# Patient Record
Sex: FEMALE
[De-identification: names, ages, dates, MRNs, and addresses within clinical notes are randomized per-mention and may not be internally consistent; named-entity substitution may affect disease eponyms.]

---

## 2019-09-13 ENCOUNTER — HOSPITAL ENCOUNTER (OUTPATIENT)
Dept: HOSPITAL 92 - BICMAMMO | Age: 59
Discharge: HOME | End: 2019-09-13
Attending: PHYSICIAN ASSISTANT
Payer: COMMERCIAL

## 2019-09-13 DIAGNOSIS — Z12.31: Primary | ICD-10-CM

## 2019-09-13 PROCEDURE — 77063 BREAST TOMOSYNTHESIS BI: CPT

## 2019-09-13 PROCEDURE — 77067 SCR MAMMO BI INCL CAD: CPT

## 2019-09-13 NOTE — MMO
Bilateral MAMMO Bilat Screen [facility].

 

CLINICAL HISTORY:

Patient is 58 years old and is seen for screening. The patient has no family

history of breast cancer.  The patient has no personal history of cancer.

 

VIEWS:

The views performed were:  bilateral craniocaudal with tomosynthesis and

bilateral mediolateral oblique with tomosynthesis.

 

FILMS COMPARED:

The present examination has been compared to prior imaging studies performed at

[facility] on 10/29/2004, and at [facility] on

08/13/1998 and 08/08/2000.

 

This study has been interpreted with the assistance of computer-aided detection.

 

MAMMOGRAM FINDINGS:

There are scattered fibroglandular densities.

 

There are no suspicious masses, suspicious calcifications, or new areas of

architectural distortion.

 

IMPRESSION:

THERE IS NO MAMMOGRAPHIC EVIDENCE OF MALIGNANCY.

 

A ROUTINE FOLLOW-UP MAMMOGRAM IN 1 YEAR IS RECOMMENDED.

 

THE RESULTS OF THIS EXAM WERE SENT TO THE PATIENT.

 

ACR BI-RADS Category 1 - Negative

 

MAMMOGRAPHY NOTE:

 1. A negative mammogram report should not delay a biopsy if a dominant of

 clinically suspicious mass is present.

 2. Approximately 10% to 15% of breast cancers are not detected by

 mammography.

 3. Adenosis and dense breasts may obscure an underlying neoplasm.

 

 

Reported by: FOSTER GARDNER MD

Electonically Signed: [id number]

## 2020-06-08 ENCOUNTER — HOSPITAL ENCOUNTER (OUTPATIENT)
Dept: HOSPITAL 92 - BICRAD | Age: 60
Discharge: HOME | End: 2020-06-08
Attending: FAMILY MEDICINE
Payer: COMMERCIAL

## 2020-06-08 DIAGNOSIS — M43.17: ICD-10-CM

## 2020-06-08 DIAGNOSIS — M54.5: Primary | ICD-10-CM

## 2020-06-08 DIAGNOSIS — M43.16: ICD-10-CM

## 2020-06-08 PROCEDURE — 72100 X-RAY EXAM L-S SPINE 2/3 VWS: CPT

## 2020-06-08 NOTE — RAD
LUMBAR SPINE 2 VIEWS:

 

DATE: 6/8/2020.

 

COMPARISON: 

None.

 

History 

Low back pain for 2 months radiating into bilateral lower extremities.

 

FINDINGS: 

Five lumbar-type vertebral bodies are present with intact pedicles on frontal imaging.  There is prom
inent lower lumbar spine facet hypertrophy on the left at L4-5 and bilaterally at L5-S1.  Anterolisth
esis is noted at L4-5 measuring 8 mm.  Anterolisthesis of L5 on S1 measures 6 mm.  No acute fracture.


 

IMPRESSION: 

Lower lumbar spine facet hypertrophy with anterolisthesis at L4-5 and L5-S1.  No acute osseous abnorm
ality.

 

POS: [employer]